# Patient Record
Sex: FEMALE
[De-identification: names, ages, dates, MRNs, and addresses within clinical notes are randomized per-mention and may not be internally consistent; named-entity substitution may affect disease eponyms.]

---

## 2018-08-03 ENCOUNTER — RESULT REVIEW (OUTPATIENT)
Age: 63
End: 2018-08-03

## 2022-06-27 PROBLEM — Z00.00 ENCOUNTER FOR PREVENTIVE HEALTH EXAMINATION: Status: ACTIVE | Noted: 2022-06-27

## 2022-06-29 ENCOUNTER — RESULT REVIEW (OUTPATIENT)
Age: 67
End: 2022-06-29

## 2022-06-29 ENCOUNTER — OUTPATIENT (OUTPATIENT)
Dept: OUTPATIENT SERVICES | Facility: HOSPITAL | Age: 67
LOS: 1 days | End: 2022-06-29

## 2022-06-29 ENCOUNTER — APPOINTMENT (OUTPATIENT)
Dept: RHEUMATOLOGY | Facility: CLINIC | Age: 67
End: 2022-06-29

## 2022-06-29 ENCOUNTER — APPOINTMENT (OUTPATIENT)
Dept: RADIOLOGY | Facility: CLINIC | Age: 67
End: 2022-06-29

## 2022-06-29 ENCOUNTER — LABORATORY RESULT (OUTPATIENT)
Age: 67
End: 2022-06-29

## 2022-06-29 VITALS
HEART RATE: 70 BPM | OXYGEN SATURATION: 98 % | SYSTOLIC BLOOD PRESSURE: 130 MMHG | DIASTOLIC BLOOD PRESSURE: 69 MMHG | BODY MASS INDEX: 37.1 KG/M2 | WEIGHT: 265 LBS | TEMPERATURE: 98.4 F | HEIGHT: 71 IN

## 2022-06-29 DIAGNOSIS — Z72.89 OTHER PROBLEMS RELATED TO LIFESTYLE: ICD-10-CM

## 2022-06-29 DIAGNOSIS — Z86.79 PERSONAL HISTORY OF OTHER DISEASES OF THE CIRCULATORY SYSTEM: ICD-10-CM

## 2022-06-29 DIAGNOSIS — Z86.39 PERSONAL HISTORY OF OTHER ENDOCRINE, NUTRITIONAL AND METABOLIC DISEASE: ICD-10-CM

## 2022-06-29 DIAGNOSIS — M21.42 FLAT FOOT [PES PLANUS] (ACQUIRED), LEFT FOOT: ICD-10-CM

## 2022-06-29 DIAGNOSIS — Z87.891 PERSONAL HISTORY OF NICOTINE DEPENDENCE: ICD-10-CM

## 2022-06-29 DIAGNOSIS — Z78.9 OTHER SPECIFIED HEALTH STATUS: ICD-10-CM

## 2022-06-29 DIAGNOSIS — N20.0 CALCULUS OF KIDNEY: ICD-10-CM

## 2022-06-29 DIAGNOSIS — M21.41 FLAT FOOT [PES PLANUS] (ACQUIRED), RIGHT FOOT: ICD-10-CM

## 2022-06-29 PROCEDURE — 36415 COLL VENOUS BLD VENIPUNCTURE: CPT

## 2022-06-29 PROCEDURE — 73630 X-RAY EXAM OF FOOT: CPT | Mod: 26,LT,RT

## 2022-06-29 PROCEDURE — 99204 OFFICE O/P NEW MOD 45 MIN: CPT | Mod: 25

## 2022-06-30 PROBLEM — Z78.9 CURRENT NON-SMOKER: Status: ACTIVE | Noted: 2022-06-30

## 2022-06-30 PROBLEM — Z86.39 HISTORY OF DIABETES MELLITUS: Status: RESOLVED | Noted: 2022-06-29 | Resolved: 2022-06-30

## 2022-06-30 PROBLEM — Z72.89 ALCOHOL USE: Status: ACTIVE | Noted: 2022-06-30

## 2022-06-30 PROBLEM — Z86.79 HISTORY OF HYPERTENSION: Status: RESOLVED | Noted: 2022-06-29 | Resolved: 2022-06-30

## 2022-06-30 PROBLEM — Z87.891 FORMER SMOKER: Status: ACTIVE | Noted: 2022-06-30

## 2022-06-30 PROBLEM — N20.0 KIDNEY STONE ON RIGHT SIDE: Status: RESOLVED | Noted: 2022-06-29 | Resolved: 2022-06-30

## 2022-06-30 LAB
CRP SERPL-MCNC: 24 MG/L
ERYTHROCYTE [SEDIMENTATION RATE] IN BLOOD BY WESTERGREN METHOD: 74 MM/HR
RHEUMATOID FACT SER QL: <10 IU/ML

## 2022-06-30 RX ORDER — LOSARTAN POTASSIUM 100 MG/1
100 TABLET, FILM COATED ORAL
Qty: 90 | Refills: 0 | Status: ACTIVE | COMMUNITY
Start: 2021-12-07

## 2022-06-30 RX ORDER — GLIPIZIDE 5 MG/1
5 TABLET, FILM COATED, EXTENDED RELEASE ORAL
Qty: 90 | Refills: 0 | Status: ACTIVE | COMMUNITY
Start: 2021-12-07

## 2022-06-30 RX ORDER — ROSUVASTATIN CALCIUM 5 MG/1
5 TABLET, FILM COATED ORAL
Qty: 90 | Refills: 0 | Status: ACTIVE | COMMUNITY
Start: 2021-05-12

## 2022-06-30 RX ORDER — PANTOPRAZOLE 40 MG/1
40 TABLET, DELAYED RELEASE ORAL
Qty: 90 | Refills: 0 | Status: ACTIVE | COMMUNITY
Start: 2021-12-07

## 2022-06-30 NOTE — PHYSICAL EXAM
[General Appearance - Alert] : alert [General Appearance - In No Acute Distress] : in no acute distress [General Appearance - Well Nourished] : well nourished [General Appearance - Well Developed] : well developed [General Appearance - Well-Appearing] : healthy appearing [Sclera] : the sclera and conjunctiva were normal [Respiration, Rhythm And Depth] : normal respiratory rhythm and effort [Exaggerated Use Of Accessory Muscles For Inspiration] : no accessory muscle use [No Spinal Tenderness] : no spinal tenderness [Nail Clubbing] : no clubbing  or cyanosis of the fingernails [] : no rash [Skin Lesions] : no skin lesions [Oriented To Time, Place, And Person] : oriented to person, place, and time [Impaired Insight] : insight and judgment were intact [Affect] : the affect was normal [Mood] : the mood was normal [FreeTextEntry1] : pes planus bilaterally.   pedal edema and ankle edema bilaterally, nontender with minimal warmth.

## 2022-06-30 NOTE — HISTORY OF PRESENT ILLNESS
[FreeTextEntry1] : 66 year old woman referred for rheumatology evaluation\par Patient with bilateral ankle pain and swelling \par \par History of discectomy in lumbar spine about 15-16 years ago\par \par Still feels pain \par Feels ankles are swollen and painful\par Pes planus  bilaterally\par \par History of IBS with constipation\par No history of IBD\par Was taking linzess but due to cost of medication, unable to continue\par \par Pain in the ankles, no other joints\par Years ago had knee swelling in the bilateral knees but has not recurred since that time\par Tried compression socks, but difficult to put on, discussed trying to put on when wakes up or after elevating legs for some time\par Swims for exercise\par Tries to walk an hour per day if does not go to gym\par No other joints involved \par \par 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

## 2022-06-30 NOTE — REVIEW OF SYSTEMS
[Joint Pain] : joint pain [Joint Swelling] : joint swelling [Joint Stiffness] : joint stiffness [Negative] : Genitourinary [FreeTextEntry9] : ankles and feet

## 2022-06-30 NOTE — ASSESSMENT
[FreeTextEntry1] : 66-year-old woman referred for rheumatology evaluation.  Patient with bilateral ankle and foot pain and swelling which has been present for some time likely exacerbated by pes planus bilaterally.  At this time, it is unclear if  there is also an underlying inflammatory component to symptoms, will obtain x-rays of the foot and ankle bilaterally today as well as blood tests today in office, including Rheumatoid factor, anti-CCP antibody, ESR, CRP, Lyme testing, and ACE level. Possible further evaluation with chest x-ray if all other testing unremarkable as sarcoidosis can also present with bilateral ankle edema and pain. Patient without any pulmonary symptoms no history of asthma in the past. Discussed supportive footwear and possible podiatry evaluation for insoles as well. Further management pending results

## 2022-07-05 ENCOUNTER — TRANSCRIPTION ENCOUNTER (OUTPATIENT)
Age: 67
End: 2022-07-05

## 2022-07-05 LAB
ACE BLD-CCNC: 21 U/L
CCP AB SER IA-ACNC: <8 UNITS
RF+CCP IGG SER-IMP: NEGATIVE

## 2022-07-06 ENCOUNTER — LABORATORY RESULT (OUTPATIENT)
Age: 67
End: 2022-07-06

## 2022-07-06 ENCOUNTER — APPOINTMENT (OUTPATIENT)
Dept: RHEUMATOLOGY | Facility: CLINIC | Age: 67
End: 2022-07-06

## 2022-07-06 VITALS
OXYGEN SATURATION: 98 % | BODY MASS INDEX: 37.8 KG/M2 | HEIGHT: 71 IN | SYSTOLIC BLOOD PRESSURE: 138 MMHG | WEIGHT: 270 LBS | TEMPERATURE: 97.8 F | DIASTOLIC BLOOD PRESSURE: 70 MMHG | HEART RATE: 83 BPM

## 2022-07-06 DIAGNOSIS — R79.82 ELEVATED C-REACTIVE PROTEIN (CRP): ICD-10-CM

## 2022-07-06 DIAGNOSIS — M25.472 EFFUSION, LEFT ANKLE: ICD-10-CM

## 2022-07-06 DIAGNOSIS — R70.0 ELEVATED ERYTHROCYTE SEDIMENTATION RATE: ICD-10-CM

## 2022-07-06 DIAGNOSIS — M25.471 EFFUSION, RIGHT ANKLE: ICD-10-CM

## 2022-07-06 PROCEDURE — 99213 OFFICE O/P EST LOW 20 MIN: CPT | Mod: 25

## 2022-07-06 PROCEDURE — 36415 COLL VENOUS BLD VENIPUNCTURE: CPT

## 2022-07-06 NOTE — ASSESSMENT
[FreeTextEntry1] : 66-year-old woman returns for rheumatology follow up.  Patient with bilateral ankle and foot pain and swelling which has been present for some time likely exacerbated by pes planus bilaterally.  At this time, it is unclear if  there is also an underlying inflammatory component to symptoms, however given elevated ESR and CRP, will repeat labs today including CMP, ESR, CRP, THERESA, and HLA B27. Previous RF, CCP, and ACE levels in the normal range.  Discussed further evaluation with chest x-ray if all other testing unremarkable as sarcoidosis can also present with bilateral ankle edema and pain. Patient without any pulmonary symptoms no history of asthma in the past. Discussed supportive footwear and possible podiatry evaluation for insoles as well. Discussed trial of meloxicam 15 mg qday for 7-14 days. Further management pending results

## 2022-07-06 NOTE — HISTORY OF PRESENT ILLNESS
[FreeTextEntry1] : 66 year old woman referred for rheumatology evaluation\par Patient with bilateral ankle pain and swelling \par \par History of discectomy in lumbar spine about 15-16 years ago\par \par Still feels pain \par Feels ankles are swollen and painful\par Pes planus  bilaterally\par \par History of IBS with constipation\par No history of IBD\par Was taking linzess but due to cost of medication, unable to continue\par \par Pain in the ankles, no other joints\par Years ago had knee swelling in the bilateral knees but has not recurred since that time\par Tried compression socks, but difficult to put on, discussed trying to put on when wakes up or after elevating legs for some time\par Swims for exercise\par Tries to walk an hour per day if does not go to gym\par No other joints involved \par \par 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 July 6, 2022\par Patient returns for follow-up of test result\par Reviewed lab results with patient\par Reviewed x-rays with images with patient\par Patient walks with insoles in the shoes, wears sneakers for long walks

## 2022-07-06 NOTE — PHYSICAL EXAM
[General Appearance - Alert] : alert [General Appearance - In No Acute Distress] : in no acute distress [General Appearance - Well-Appearing] : healthy appearing [Sclera] : the sclera and conjunctiva were normal [Respiration, Rhythm And Depth] : normal respiratory rhythm and effort [Exaggerated Use Of Accessory Muscles For Inspiration] : no accessory muscle use [Abnormal Walk] : normal gait [Nail Clubbing] : no clubbing  or cyanosis of the fingernails [Motor Tone] : muscle strength and tone were normal [] : no rash [Skin Lesions] : no skin lesions [Oriented To Time, Place, And Person] : oriented to person, place, and time [Impaired Insight] : insight and judgment were intact [Affect] : the affect was normal [FreeTextEntry1] : Ankle edema bilaterally

## 2022-07-07 LAB
ALBUMIN SERPL ELPH-MCNC: 4.3 G/DL
ALP BLD-CCNC: 89 U/L
ALT SERPL-CCNC: 17 U/L
ANION GAP SERPL CALC-SCNC: 15 MMOL/L
AST SERPL-CCNC: 26 U/L
BILIRUB SERPL-MCNC: 0.3 MG/DL
BUN SERPL-MCNC: 13 MG/DL
CALCIUM SERPL-MCNC: 9.6 MG/DL
CHLORIDE SERPL-SCNC: 104 MMOL/L
CO2 SERPL-SCNC: 23 MMOL/L
CREAT SERPL-MCNC: 0.63 MG/DL
CRP SERPL-MCNC: 25 MG/L
EGFR: 97 ML/MIN/1.73M2
ERYTHROCYTE [SEDIMENTATION RATE] IN BLOOD BY WESTERGREN METHOD: 71 MM/HR
GLUCOSE SERPL-MCNC: 120 MG/DL
POTASSIUM SERPL-SCNC: 4 MMOL/L
PROT SERPL-MCNC: 7.6 G/DL
SODIUM SERPL-SCNC: 142 MMOL/L

## 2022-07-08 ENCOUNTER — NON-APPOINTMENT (OUTPATIENT)
Age: 67
End: 2022-07-08

## 2022-07-08 DIAGNOSIS — M25.572 PAIN IN RIGHT ANKLE AND JOINTS OF RIGHT FOOT: ICD-10-CM

## 2022-07-08 DIAGNOSIS — M25.571 PAIN IN RIGHT ANKLE AND JOINTS OF RIGHT FOOT: ICD-10-CM

## 2022-07-08 LAB
ANA PAT FLD IF-IMP: ABNORMAL
ANACR T: ABNORMAL

## 2022-07-08 RX ORDER — NAPROXEN 375 MG/1
375 TABLET ORAL
Qty: 28 | Refills: 0 | Status: ACTIVE | COMMUNITY
Start: 2022-07-08 | End: 1900-01-01

## 2022-07-14 LAB — HLA-B27 RELATED AG QL: NEGATIVE
